# Patient Record
Sex: MALE | Race: WHITE | NOT HISPANIC OR LATINO | Employment: OTHER | URBAN - METROPOLITAN AREA
[De-identification: names, ages, dates, MRNs, and addresses within clinical notes are randomized per-mention and may not be internally consistent; named-entity substitution may affect disease eponyms.]

---

## 2017-11-20 ENCOUNTER — NEW PATIENT COMPREHENSIVE (OUTPATIENT)
Dept: URBAN - METROPOLITAN AREA CLINIC 43 | Facility: CLINIC | Age: 72
End: 2017-11-20

## 2017-11-20 DIAGNOSIS — H25.811: ICD-10-CM

## 2017-11-20 DIAGNOSIS — H25.812: ICD-10-CM

## 2017-11-20 PROCEDURE — 92015 DETERMINE REFRACTIVE STATE: CPT

## 2017-11-20 PROCEDURE — 99204 OFFICE O/P NEW MOD 45 MIN: CPT

## 2017-11-20 PROCEDURE — 92025-2 CORNEAL TOPOGRAPHY, PT

## 2017-11-20 PROCEDURE — 92136TC INTERFEROMETRY - TECHNICAL COMPONENT

## 2017-11-20 RX ORDER — NEPAFENAC 3 MG/ML: 1 SUSPENSION/ DROPS OPHTHALMIC ONCE A DAY

## 2017-11-20 RX ORDER — MOXIFLOXACIN HYDROCHLORIDE 5 MG/ML: 1 SOLUTION/ DROPS OPHTHALMIC

## 2017-11-20 ASSESSMENT — VISUAL ACUITY
OD_CC: 20/60-1
OD_GLARE: 20/400
OD_SC: 20/50-1
OD_CC: J1
OS_SC: 20/60-1
OD_SC: J12
OS_GLARE: 20/200
OS_CC: J1
OS_CC: 20/60-1
OS_SC: J12

## 2017-11-20 ASSESSMENT — TONOMETRY
OS_IOP_MMHG: 15
OD_IOP_MMHG: 15

## 2021-01-08 NOTE — PATIENT DISCUSSION
GLAUCOMA:  I have talked with the patient about my impressions, explained our treatment plan, and have answered all questions and patient understands. Continue present eye drops.  Patient to follow up in a year - dilate and OCT